# Patient Record
Sex: FEMALE | Race: WHITE | NOT HISPANIC OR LATINO | Employment: FULL TIME | URBAN - METROPOLITAN AREA
[De-identification: names, ages, dates, MRNs, and addresses within clinical notes are randomized per-mention and may not be internally consistent; named-entity substitution may affect disease eponyms.]

---

## 2017-01-25 ENCOUNTER — OFFICE VISIT (OUTPATIENT)
Dept: OTOLARYNGOLOGY | Facility: CLINIC | Age: 32
End: 2017-01-25
Payer: COMMERCIAL

## 2017-01-25 VITALS
WEIGHT: 154.31 LBS | TEMPERATURE: 99 F | HEART RATE: 83 BPM | SYSTOLIC BLOOD PRESSURE: 121 MMHG | DIASTOLIC BLOOD PRESSURE: 75 MMHG

## 2017-01-25 DIAGNOSIS — R49.9 VOICE DISTURBANCE: ICD-10-CM

## 2017-01-25 DIAGNOSIS — J38.01 UNILATERAL COMPLETE VOCAL FOLD PARALYSIS: Primary | ICD-10-CM

## 2017-01-25 PROCEDURE — 31579 LARYNGOSCOPY TELESCOPIC: CPT | Mod: S$GLB,,, | Performed by: OTOLARYNGOLOGY

## 2017-01-25 PROCEDURE — 99999 PR PBB SHADOW E&M-NEW PATIENT-LVL III: CPT | Mod: PBBFAC,,, | Performed by: OTOLARYNGOLOGY

## 2017-01-25 PROCEDURE — 99243 OFF/OP CNSLTJ NEW/EST LOW 30: CPT | Mod: 25,S$GLB,, | Performed by: OTOLARYNGOLOGY

## 2017-01-25 RX ORDER — LEVOMEFOLATE CALCIUM 15 MG
TABLET ORAL
Refills: 3 | COMMUNITY
Start: 2016-10-24

## 2017-01-25 NOTE — MR AVS SNAPSHOT
[facility]  [address]  [facility] [address]  Phone: [phone]  Fax: [phone]                  Nicole Banegas   2017 3:00 PM   Office Visit    Description:  Female : 1985   Provider:  Anshul Baldwin MD   Department:  [facility]           Reason for Visit     Consult           Diagnoses this Visit        Comments    Unilateral complete vocal fold paralysis    -  Primary     Voice disturbance                To Do List           Goals (5 Years of Data)     None      Follow-Up and Disposition     Return if symptoms worsen or fail to improve.      [facility]     [facility] -  Assistance  Registered nurses in the [facility] provide clinical advisement, health education, appointment booking, and other advisory services.  Call for this free service at [phone].             Medications           Message regarding Medications     Verify the changes and/or additions to your medication regime listed below are the same as discussed with your clinician today.  If any of these changes or additions are incorrect, please notify your healthcare provider.             Verify that the below list of medications is an accurate representation of the medications you are currently taking.  If none reported, the list may be blank. If incorrect, please contact your healthcare provider. Carry this list with you in case of emergency.           Current Medications     CHOLECALCIFEROL, VITAMIN D3, (D3 DOTS ORAL) Take by mouth.    FISH OIL/OMEGA-3/E/FA/B6-B12 ([id number]-E-FA-FISH OIL ORAL) Take by mouth.    L-METHYLFOLATE 15 mg Tab TAKE ONE caplet DAILY           Clinical Reference Information           Vital Signs - Last Recorded  Most recent update: 2017  3:12 PM by Lena Ricci MA    BP Pulse Temp Wt          121/75 83 99.2 °F (37.3 °C) (Tympanic) 70 kg (154 lb 5.2 oz)        Blood  Pressure          Most Recent Value    BP  121/75      Allergies as of 1/25/2017     No Known Allergies      Immunizations Administered on Date of Encounter - 1/25/2017     None      [facility] Sign-Up     Activating your [facility] account is as easy as 1-2-3!     1) Visit [email or web address], select Sign Up Now, enter this activation code and your date of birth, then select Next.  [email or web address]  Expires: 3/11/2017  4:15 PM      2) Create a username and password to use when you visit [facility] in the future and select a security question in case you lose your password and select Next.    3) Enter your e-mail address and click Sign Up!    Additional Information  If you have questions, please e-mail [email or web address] or call [phone] to talk to our [facility] staff. Remember, [facility] is NOT to be used for urgent needs. For medical emergencies, dial 911.         Instructions    Call with questions

## 2017-01-25 NOTE — LETTER
January 30, 2017      Raman Carver MD  [address]  [place] [address]           [facility]  [address]  [facility] [address]  Phone: [phone]  Fax: [phone]          Patient: Nicole Banegas   MR Number: [id number]   YOB: 1985   Date of Visit: 1/25/2017       Dear Dr. Raman Carver:    Thank you for referring Nicole Banegas to me for evaluation. Attached you will find relevant portions of my assessment and plan of care.    If you have questions, please do not hesitate to call me. I look forward to following Nicole Banegas along with you.    Sincerely,    Anshul Baldwin MD    Enclosure  CC:  No Recipients    If you would like to receive this communication electronically, please contact [email or web address] or [phone] to request more information on [employer] Link access.    For providers and/or their staff who would like to refer a patient to [facility], please contact us through our one-stop-shop provider referral line, [facility], at [phone].    If you feel you have received this communication in error or would no longer like to receive these types of communications, please e-mail [email or web address]

## 2017-01-25 NOTE — PROGRESS NOTES
[facility]  Department of Otorhinolaryngology and Communication Sciences    Nicole Banegas is a 31 y.o. female who presents to the [facility] for consultation at the kind request of Dr. Raman Carver for further management of right vocal fold paralysis. She has recently entered her 2nd trimester of pregnancy. She is a nurse in [place]. She complains of a weak, breathy voice, which has only recently returned to normal.     Enlarged lymph node, biopsied, no malignancy.  Clear CXR and blood work.      Onset was sudden. Duration is over 2 months, in conjunction with a URI. Time course was constant. Symptoms have finally improved, as soon as she entered her 2nd trimester. She denies any exacerbating factors. She denies any alleviating factors. Associated symptoms include a lymph node in her right neck. This was palpable by both her and the physicians she had seen previously. Workup for the vocal cord paralysis included a CXR and a neck ultrasound. The lymph node was estimated at about 2 cm. She did undergo an FNA. Findings from the FNA are not available for review but were reported as benign reactive change without evidence of malignancy. Since the FNA, the lymph node is no longer palpable. At the same time, her voice has restored completely to normal. She is pleased with her progress and is without any complaints at this time.    Past Medical History  Healthy    Past Surgical History  Lower back surgery  D&C    Family History  Heart disease in mother and father    Social History  She reports that she has never smoked. She does not have any smokeless tobacco history on file.    Allergies  She has No Known Allergies.    Medications  She has a current medication list which includes the following prescription(s): cholecalciferol (vitamin d3), fish oil/omega-3/e/fa/b6-b12, and l-methylfolate.    Review of Systems   Constitutional: Negative for fever.   HENT: Negative for sore throat.    Eyes:  Negative for visual disturbance.   Respiratory: Negative for wheezing.    Cardiovascular: Negative for chest pain.   Gastrointestinal: Negative for nausea.   Musculoskeletal: Negative for arthralgias.   Skin: Negative for rash.   Neurological: Negative for tremors.   Hematological: Does not bruise/bleed easily.   Psychiatric/Behavioral: The patient is not nervous/anxious.           Objective:     Visit Vitals    /75    Pulse 83    Temp 99.2 °F (37.3 °C) (Tympanic)    Wt 70 kg (154 lb 5.2 oz)      Physical Exam    Constitutional: comfortable, well dressed  Psychiatric: appropriate affect  Respiratory: comfortably breathing, symmetric chest rise, no stridor  Voice: normal for age and gender  Cardiovascular: upper extremities non-edematous  Lymphatic: no cervical lymphadenopathy  Neurologic: alert and oriented to time, place, person, and situation; cranial nerves 3-12 grossly intact  Head: normocephalic  Eyes: conjunctivae and sclerae clear  Ears: normal pinnae, normal external auditory canals, tympanic membranes intact  Nose: mucosa pink and noncongested, no masses, no mucopurulence, no polyps  Oral cavity / oropharynx: no mucosal lesions  Neck: soft, full range of motion, laryngotracheal complex palpable with appropriate landmarks, larynx elevates on swallowing  Indirect laryngoscopy: limited due to gag    Procedure  Rigid Laryngeal Videostroboscopy (96413): Laryngeal videostroboscopy is indicated to assess the laryngeal vibratory biomechanics and vocal fold oscillation, which cannot be assessed with a plain light examination. This was carried out with a 70 degree endoscope. After verbal consent was obtained, the patient was positioned and the tongue was gently secured with a gauze sponge. The endoscope was passed transorally and positioned to image the larynx and hypopharynx in detail. The following featured were examined: laryngeal and hypopharyngeal masses; vocal fold range and symmetry of motion;  laryngeal mucosal edema, erythema, inflammation, and hydration; salivary pooling; and gross laryngeal sensation. During phonation, the vocal folds were assesses for glottal closure; mucosal wave; vocal fold lesions; vibratory periodicity, amplitude, and phase symmetry; and vertical height match. The equipment was removed. The patient tolerated the procedure well without complication. All findings were normal except:  - trace atrophy of right vocal fold  - complete adduction, abduction, and elongation  - complete glottal closure with symmetry of phase/amplitude of mucosal wave      Assessment:     Nicole Banegas is a 31 y.o. female with a history of a right vocal fold paralysis. Her voice has restored to normal, and her laryngeal examination shows only some mild evidence of a recovered paralysis. Her history is consistent with a post-URI, viral-mediated process.      Plan:        I had a discussion with the patient and her  regarding her condition and the further workup and management options.      The patient is reassured that these symptoms do not appear to represent a serious or threatening condition. She will follow up with me in the future on an as-needed basis.    All questions were answered, and the patient is in agreement with the above.     Anshul Baldwin M.D.  [facility]  Department of Otorhinolaryngology and Communication Sciences

## 2019-05-16 DIAGNOSIS — Z36.82 ENCOUNTER FOR NUCHAL TRANSLUCENCY TESTING: Primary | ICD-10-CM

## 2019-05-20 ENCOUNTER — TELEPHONE (OUTPATIENT)
Dept: MATERNAL FETAL MEDICINE | Facility: CLINIC | Age: 34
End: 2019-05-20

## 2019-06-12 ENCOUNTER — PROCEDURE VISIT (OUTPATIENT)
Dept: MATERNAL FETAL MEDICINE | Facility: CLINIC | Age: 34
End: 2019-06-12
Payer: COMMERCIAL

## 2019-06-12 VITALS
SYSTOLIC BLOOD PRESSURE: 122 MMHG | DIASTOLIC BLOOD PRESSURE: 81 MMHG | BODY MASS INDEX: 36.25 KG/M2 | WEIGHT: 197 LBS | HEIGHT: 62 IN

## 2019-06-12 DIAGNOSIS — Z82.79 FAMILY HISTORY OF CHROMOSOMAL ABNORMALITY: ICD-10-CM

## 2019-06-12 DIAGNOSIS — O09.291 HISTORY OF PRETERM PREMATURE RUPTURE OF MEMBRANES (PROM) IN PREVIOUS PREGNANCY, CURRENTLY PREGNANT IN FIRST TRIMESTER: ICD-10-CM

## 2019-06-12 DIAGNOSIS — N96 HISTORY OF MULTIPLE MISCARRIAGES: ICD-10-CM

## 2019-06-12 DIAGNOSIS — Z36.82 ENCOUNTER FOR NUCHAL TRANSLUCENCY TESTING: ICD-10-CM

## 2019-06-12 DIAGNOSIS — Z36.89 ENCOUNTER FOR FETAL ANATOMIC SURVEY: Primary | ICD-10-CM

## 2019-06-12 DIAGNOSIS — O16.1 HYPERTENSION AFFECTING PREGNANCY IN FIRST TRIMESTER: ICD-10-CM

## 2019-06-12 PROCEDURE — 76801 PR US, OB <14WKS, TRANSABD, SINGLE GESTATION: ICD-10-PCS | Mod: ,,, | Performed by: OBSTETRICS & GYNECOLOGY

## 2019-06-12 PROCEDURE — 99204 PR OFFICE/OUTPT VISIT, NEW, LEVL IV, 45-59 MIN: ICD-10-PCS | Mod: 25,,, | Performed by: OBSTETRICS & GYNECOLOGY

## 2019-06-12 PROCEDURE — 76801 OB US < 14 WKS SINGLE FETUS: CPT | Mod: ,,, | Performed by: OBSTETRICS & GYNECOLOGY

## 2019-06-12 PROCEDURE — 76813 OB US NUCHAL MEAS 1 GEST: CPT | Mod: ,,, | Performed by: OBSTETRICS & GYNECOLOGY

## 2019-06-12 PROCEDURE — 99204 OFFICE O/P NEW MOD 45 MIN: CPT | Mod: 25,,, | Performed by: OBSTETRICS & GYNECOLOGY

## 2019-06-12 PROCEDURE — 76813 PR US, OB NUCHAL, TRANSABDOM/TRANSVAG, FIRST GESTATION: ICD-10-PCS | Mod: ,,, | Performed by: OBSTETRICS & GYNECOLOGY

## 2019-06-12 RX ORDER — NAPROXEN SODIUM 220 MG/1
81 TABLET, FILM COATED ORAL DAILY
COMMUNITY

## 2019-06-12 RX ORDER — PROGESTERONE 200 MG/1
200 CAPSULE ORAL DAILY
Refills: 3 | COMMUNITY
Start: 2019-04-23

## 2019-06-12 NOTE — PROGRESS NOTES
Chief complaint: CHTN, Paternal chromosomal deletion, multiple miscarriage    Provider requesting consultation: Dr. Fleming    33 y.o. [id number] 11w0d EGA.    PMH:  Past Medical History:   Diagnosis Date    ADD (attention deficit disorder)     HPV (human papilloma virus) anogenital infection     Hypertension        PObHx:  OB History    Para Term  AB Living   6 1   1 4 1   SAB TAB Ectopic Multiple Live Births   4       1      # Outcome Date GA Lbr Jesus/2nd Weight Sex Delivery Anes PTL Lv   6 Current            5 SAB 2018 8w0d          4 SAB 2017 8w0d          3  17 34w0d  2.863 kg (6 lb 5 oz) M Vag-Spont  Y KAVITA      Complications:  premature rupture of membranes (PPROM) with unknown onset of labor, History of pre-eclampsia in prior pregnancy, currently pregnant   2 SAB 16 8w0d          1 SAB 08/01/15 8w0d              PSH:  Past Surgical History:   Procedure Laterality Date    BACK SURGERY      DILATION AND CURETTAGE OF UTERUS         Family history:family history is not on file.    Social history: reports that she has never smoked. She has never used smokeless tobacco. She reports that she drank alcohol. She reports that she does not use drugs.    A detailed fetal ultrasound was completed today.  See details in imaging section of EPIC.    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  The FOB was found to carry this deletion.  It is uncertain to patient recollection as to whether this is a balanced translocation (probably not) or an unbalanced deletion.  The child experienced delays in development and now mostly limited to speech delays.  They are aware that there is a 50% chance this will get passed to this pregnancy.  She is having a Mookie done in Dr. Fleming's office and at this time declines any invasive testing.  She is aware this will not test for the deletion.  The next step in her evaluation this pregnancy will be an  anatomy scan at 20 weeks.  As the previous child had a VSD, a fetal echocardiogram  should be considered.      We then discussed her history of recurrent miscarriage.  She has had a negative workup for this.  It is likely that this is 2/2 the above noted deletion.  All have been with the same dad and his ex wife had recurrent miscarriages.  As the area of the deletion is genetically less stable, the chances of varying size deletions being passed on increase the chance for miscarriage.      Prior spontaneous  birth counseling and recommendations:    Today I discussed with the patient her prior history of  delivery 2/2 PPROM.  I reviewed with her the increased risk of recurrence of  delivery in women who have a history of a prior spontaneous  birth.  I discussed current ACOG guidelines that recommend in patients with a prior history of spontaneous  birth they be offered progesterone therapy for the prevention of recurrent  birth.  This is usually accomplished via weekly 250mg intramuscular injections of 17-alpha-hydroxyprogesterone caproate beginning at 16-20 weeks estimated gestational age and continuing until 37 weeks.  (I anticipate this can be arranged by her primary obstetrical provider.)      I also discussed with the patient that in women at risk for recurrent  birth, surveillance for cervical length shortening with periodic cervical length measurements may help identify those in need for tocolytic therapy and administration of corticosteroids.  Here at [facility] our MFM group recommends to measure the cervix every 2-3 weeks transvaginally starting at 16-18 weeks gestation up until 24 weeks gestation.     Recommendations from our MFM group at [facility]:    -Weekly progesterone injection therapy be initiated by her primary obstetrician at 16-20 weeks gestation until 37 weeks gestation    -Cervical length surveillance every 2-3 weeks starting at 16-18 weeks gestation  up until 24 weeks gestation.  We have scheduled her next visit at 20 weeks.    We also discussed her CHTN and history of preE at 30 weeks.       A 2015 meta-analysis of individual patient data from over 75,000 women with preeclampsia who became pregnant again found that 20 percent developed hypertension in a subsequent pregnancy and 16 percent developed recurrent preeclampsia.  The recurrence risk varies with the severity and time of onset of the acute episode. Women with early onset, severe preeclampsia are at greatest risk of recurrence (as high as 25 to 65 percent). The risk is much lower (5 to 7 percent) in women who had non-severe preeclampsia during the first pregnancy, versus less than 1 percent in women who had a normotensive first pregnancy (does not apply to abortions). In a series of 125 women with severe second trimester preeclampsia followed for five years, 65 percent developed recurrent preeclampsia and 35 percent were normotensive in their subsequent pregnancy. Of the preeclamptic group, approximately one-third developed the disease at ?27 weeks, one-third at 28 to 36 weeks, and one-third at ?37 weeks. Thus, 21 percent of subsequent pregnancies were complicated by severe preeclampsia in the second trimester.   Recurrent preeclampsia is more likely following a preeclamptic higgins pregnancy than a preeclamptic twin pregnancy. She is on ASA prophylaxis.    The patient was given an opportunity to ask questions about management and the diease process.  She expressed an understanding of and agreement to the above impression and plan. All questions were answered to her satisfaction.  She was given contact information to the [facility] to address further concerns.      The approximate physician face-to-face time was 45 minutes. The majority of the time (>50%) was spent on counseling of the patient or coordination of care.

## 2019-08-07 ENCOUNTER — TELEPHONE (OUTPATIENT)
Dept: PEDIATRIC CARDIOLOGY | Facility: CLINIC | Age: 34
End: 2019-08-07

## 2019-08-07 ENCOUNTER — OFFICE VISIT (OUTPATIENT)
Dept: MATERNAL FETAL MEDICINE | Facility: CLINIC | Age: 34
End: 2019-08-07
Payer: COMMERCIAL

## 2019-08-07 VITALS — SYSTOLIC BLOOD PRESSURE: 104 MMHG | DIASTOLIC BLOOD PRESSURE: 72 MMHG | BODY MASS INDEX: 36.6 KG/M2 | WEIGHT: 200.13 LBS

## 2019-08-07 DIAGNOSIS — Z36.89 ENCOUNTER FOR ULTRASOUND TO ASSESS FETAL GROWTH: Primary | ICD-10-CM

## 2019-08-07 DIAGNOSIS — Z36.89 ENCOUNTER FOR FETAL ANATOMIC SURVEY: ICD-10-CM

## 2019-08-07 DIAGNOSIS — Z82.79 FAMILY HISTORY OF CHROMOSOMAL ABNORMALITY: Primary | ICD-10-CM

## 2019-08-07 PROCEDURE — 76811 OB US DETAILED SNGL FETUS: CPT | Mod: ,,, | Performed by: OBSTETRICS & GYNECOLOGY

## 2019-08-07 PROCEDURE — 76817 TRANSVAGINAL US OBSTETRIC: CPT | Mod: ,,, | Performed by: OBSTETRICS & GYNECOLOGY

## 2019-08-07 PROCEDURE — 99499 NO LOS: ICD-10-PCS | Mod: ,,, | Performed by: OBSTETRICS & GYNECOLOGY

## 2019-08-07 PROCEDURE — 76811 PR US, OB FETAL EVAL & EXAM, TRANSABDOM,FIRST GESTATION: ICD-10-PCS | Mod: ,,, | Performed by: OBSTETRICS & GYNECOLOGY

## 2019-08-07 PROCEDURE — 76817 PR US, OB, TRANSVAG APPROACH: ICD-10-PCS | Mod: ,,, | Performed by: OBSTETRICS & GYNECOLOGY

## 2019-08-07 PROCEDURE — 99499 UNLISTED E&M SERVICE: CPT | Mod: ,,, | Performed by: OBSTETRICS & GYNECOLOGY

## 2019-08-07 RX ORDER — HYDROXYPROGESTERONE CAPROATE 250 MG/ML
INJECTION SUBCUTANEOUS WEEKLY
COMMUNITY
Start: 2019-07-24

## 2019-08-27 ENCOUNTER — TELEPHONE (OUTPATIENT)
Dept: MATERNAL FETAL MEDICINE | Facility: CLINIC | Age: 34
End: 2019-08-27

## 2019-08-28 ENCOUNTER — OFFICE VISIT (OUTPATIENT)
Dept: MATERNAL FETAL MEDICINE | Facility: CLINIC | Age: 34
End: 2019-08-28
Payer: COMMERCIAL

## 2019-08-28 VITALS — DIASTOLIC BLOOD PRESSURE: 80 MMHG | SYSTOLIC BLOOD PRESSURE: 124 MMHG | WEIGHT: 204 LBS | BODY MASS INDEX: 37.31 KG/M2

## 2019-08-28 DIAGNOSIS — Z36.89 ENCOUNTER FOR ULTRASOUND TO ASSESS FETAL GROWTH: ICD-10-CM

## 2019-08-28 PROCEDURE — 99499 UNLISTED E&M SERVICE: CPT | Mod: ,,, | Performed by: OBSTETRICS & GYNECOLOGY

## 2019-08-28 PROCEDURE — 76817 TRANSVAGINAL US OBSTETRIC: CPT | Mod: ,,, | Performed by: OBSTETRICS & GYNECOLOGY

## 2019-08-28 PROCEDURE — 76816 PR  US,PREGNANT UTERUS,F/U,TRANSABD APP: ICD-10-PCS | Mod: ,,, | Performed by: OBSTETRICS & GYNECOLOGY

## 2019-08-28 PROCEDURE — 99499 NO LOS: ICD-10-PCS | Mod: ,,, | Performed by: OBSTETRICS & GYNECOLOGY

## 2019-08-28 PROCEDURE — 76816 OB US FOLLOW-UP PER FETUS: CPT | Mod: ,,, | Performed by: OBSTETRICS & GYNECOLOGY

## 2019-08-28 PROCEDURE — 76817 PR US, OB, TRANSVAG APPROACH: ICD-10-PCS | Mod: ,,, | Performed by: OBSTETRICS & GYNECOLOGY

## 2019-09-06 ENCOUNTER — CLINICAL SUPPORT (OUTPATIENT)
Dept: PEDIATRIC CARDIOLOGY | Facility: CLINIC | Age: 34
End: 2019-09-06
Attending: OBSTETRICS & GYNECOLOGY
Payer: COMMERCIAL

## 2019-09-06 ENCOUNTER — OFFICE VISIT (OUTPATIENT)
Dept: PEDIATRIC CARDIOLOGY | Facility: CLINIC | Age: 34
End: 2019-09-06
Payer: COMMERCIAL

## 2019-09-06 VITALS
SYSTOLIC BLOOD PRESSURE: 139 MMHG | HEIGHT: 62 IN | DIASTOLIC BLOOD PRESSURE: 82 MMHG | WEIGHT: 203.5 LBS | BODY MASS INDEX: 37.45 KG/M2 | HEART RATE: 108 BPM

## 2019-09-06 DIAGNOSIS — Z03.73 FETAL ANOMALY SUSPECTED BUT NOT FOUND: ICD-10-CM

## 2019-09-06 DIAGNOSIS — Z82.79 FAMILY HISTORY OF CHROMOSOMAL ABNORMALITY: ICD-10-CM

## 2019-09-06 DIAGNOSIS — Z82.79 FAMILY HISTORY OF CHROMOSOMAL ABNORMALITY: Primary | ICD-10-CM

## 2019-09-06 DIAGNOSIS — Z82.79 FAMILY HISTORY OF CONGENITAL HEART DISORDER IN BROTHER: ICD-10-CM

## 2019-09-06 PROCEDURE — 76825 ECHO EXAM OF FETAL HEART: CPT | Mod: ,,, | Performed by: PEDIATRICS

## 2019-09-06 PROCEDURE — 76827 ECHO EXAM OF FETAL HEART: CPT | Mod: ,,, | Performed by: PEDIATRICS

## 2019-09-06 PROCEDURE — 99242 PR OFFICE CONSULTATION,LEVEL II: ICD-10-PCS | Mod: 25,,, | Performed by: PEDIATRICS

## 2019-09-06 PROCEDURE — 76827 PR  SO2 FETAL HEART DOPPLER: ICD-10-PCS | Mod: ,,, | Performed by: PEDIATRICS

## 2019-09-06 PROCEDURE — 93325 PR DOPPLER COLOR FLOW VELOCITY MAP: ICD-10-PCS | Mod: ,,, | Performed by: PEDIATRICS

## 2019-09-06 PROCEDURE — 99242 OFF/OP CONSLTJ NEW/EST SF 20: CPT | Mod: 25,,, | Performed by: PEDIATRICS

## 2019-09-06 PROCEDURE — 93325 DOPPLER ECHO COLOR FLOW MAPG: CPT | Mod: ,,, | Performed by: PEDIATRICS

## 2019-09-06 PROCEDURE — 76825 PR  SO2 FETAL HEART: ICD-10-PCS | Mod: ,,, | Performed by: PEDIATRICS

## 2019-09-06 NOTE — PROGRESS NOTES
[facility] - Pediatric Cardiology Fetal Cardiology Clinic    Today, I had the pleasure of evaluating Nicole Banegas who is now 34 y.o. and carrying her sixth pregnancy at 23 2/7 weeks gestation with an ANTONIO of 20. She was referred for evaluation of the fetal heart due to history of a VSD in a previous child as well as a family history of 16p microdeletion in the child with the VSD as well as Mrs. Banegas .      She is carrying a male fetus, yet unnamed.      Obstetric History:    .  She has had three miscarriages and one ectopic pregnancy.  Her OB care is by Dr. Renetta Fleming.  Her M care is by Dr. Lopez.    Past Medical History:   Diagnosis Date    ADD (attention deficit disorder)     HPV (human papilloma virus) anogenital infection     Hypertension          Current Outpatient Medications:     aspirin 81 MG Chew, Take 81 mg by mouth once daily., Disp: , Rfl:     CHOLECALCIFEROL, VITAMIN D3, (D3 DOTS ORAL), Take by mouth., Disp: , Rfl:     L-METHYLFOLATE 15 mg Tab, TAKE ONE caplet DAILY, Disp: , Rfl: 3    ANNI, PF, 275 mg/1.1 mL AtIn, Inject into the skin once a week., Disp: , Rfl:     PNV no.95/ferrous fum/folic ac (PRENATAL ORAL), Take by mouth., Disp: , Rfl:     FISH OIL/OMEGA-3/E/FA/B6-B12 ([id number]-E-FA-FISH OIL ORAL), Take by mouth., Disp: , Rfl:     progesterone (PROMETRIUM) 200 MG capsule, Take 200 mg by mouth once daily., Disp: , Rfl: 3    Family History: Except as above, negative for congenital heart disease, early coronary artery disease, sudden unexplained death, connective tissues disorders, genetic syndromes, multiple miscarriages or other congenital anomalies.    Social History: Mrs. Banegas is  to the father of the baby.  They are both nurse practitioners.    FETAL ECHOCARDIOGRAM (summary):  Fetal echocardiogram at 23 2/7 weeks gestation for a history of a VSD in a previous child as well as a family history of 16p microdeletion. ANTONIO 20.  Normally  connected heart.  No ectopy or sustained arrhythmia demonstrated throughout the study.  Normal fetal atrial and ductal level shunting.  No ventricular level shunting.  Normal atrioventricular and semilunar valve structure and function.  Normal ductal and aortic arches.  Normal biventricular size and systolic function.  No pericardial effusion.  (Full report in electronic medical record)    Impression:  Single active male fetus at 23 wga.  Normal fetal echocardiogram.      Todays fetal echocardiogram is normal, within the limitations of fetal echocardiography.  I discussed with her that fetal echocardiography is insufficiently sensitive to rule out all septal defects, anomalies of pulmonary and systemic veins, arch anomalies, and some valvar abnormalities, nor can it ensure that the ductus arteriosus and foramen ovale will spontaneously close.     Recommendations:  Location, timing, and mode of delivery will be determined by the obstetrical team.  She does not require further follow-up in the fetal echocardiography clinic, but I would be happy to see her again if additional questions or concerns arise.    Should there be any concerns about the baby's heart after birth, a post-sarahi echocardiogram and cardiology consultation are recommended.     Given the family history, I would like to see the baby in clinic after birth for an echocardiogram to ensure there are no small VSDs present.  She will make an appointment at her convenience.      The above information was discussed in detail including the use of diagrams, with 30 minutes of total face to face time, with greater than 50% with counseling and coordination of care.  The discussion of the diagnosis and treatment options is as described above.      Trever Hidalgo MD, MPH  Pediatric and Fetal Cardiology  [facility]   [address] [place] [address]    Office: [phone]  Cell: [phone]

## 2019-09-25 ENCOUNTER — PROCEDURE VISIT (OUTPATIENT)
Dept: MATERNAL FETAL MEDICINE | Facility: CLINIC | Age: 34
End: 2019-09-25
Payer: COMMERCIAL

## 2019-09-25 VITALS
WEIGHT: 205.69 LBS | DIASTOLIC BLOOD PRESSURE: 76 MMHG | SYSTOLIC BLOOD PRESSURE: 128 MMHG | BODY MASS INDEX: 37.38 KG/M2

## 2019-09-25 DIAGNOSIS — Z36.89 ENCOUNTER FOR ULTRASOUND TO ASSESS FETAL GROWTH: ICD-10-CM

## 2019-09-25 PROCEDURE — 99499 UNLISTED E&M SERVICE: CPT | Mod: ,,, | Performed by: OBSTETRICS & GYNECOLOGY

## 2019-09-25 PROCEDURE — 76816 PR  US,PREGNANT UTERUS,F/U,TRANSABD APP: ICD-10-PCS | Mod: ,,, | Performed by: OBSTETRICS & GYNECOLOGY

## 2019-09-25 PROCEDURE — 76816 OB US FOLLOW-UP PER FETUS: CPT | Mod: ,,, | Performed by: OBSTETRICS & GYNECOLOGY

## 2019-09-25 PROCEDURE — 99499 NO LOS: ICD-10-PCS | Mod: ,,, | Performed by: OBSTETRICS & GYNECOLOGY

## 2020-05-18 PROBLEM — O09.291 HISTORY OF PRETERM PREMATURE RUPTURE OF MEMBRANES (PROM) IN PREVIOUS PREGNANCY, CURRENTLY PREGNANT IN FIRST TRIMESTER: Status: RESOLVED | Noted: 2019-06-12 | Resolved: 2020-05-18
